# Patient Record
Sex: FEMALE | Race: WHITE | Employment: FULL TIME | URBAN - METROPOLITAN AREA
[De-identification: names, ages, dates, MRNs, and addresses within clinical notes are randomized per-mention and may not be internally consistent; named-entity substitution may affect disease eponyms.]

---

## 2019-09-13 PROBLEM — S46.819A TRAPEZIUS STRAIN: Status: ACTIVE | Noted: 2018-07-25

## 2019-09-13 PROBLEM — F41.1 GENERALIZED ANXIETY DISORDER: Status: ACTIVE | Noted: 2018-07-25

## 2019-09-13 PROBLEM — F41.0 PANIC ATTACKS: Status: ACTIVE | Noted: 2018-07-25

## 2019-09-13 PROBLEM — M67.432 GANGLION CYST OF WRIST, LEFT: Status: ACTIVE | Noted: 2018-10-16

## 2021-04-05 ENCOUNTER — HOSPITAL ENCOUNTER (EMERGENCY)
Age: 36
Discharge: HOME | End: 2021-04-05
Attending: EMERGENCY MEDICINE
Payer: MEDICARE

## 2021-04-05 ENCOUNTER — APPOINTMENT (OUTPATIENT)
Dept: GENERAL RADIOLOGY | Age: 36
End: 2021-04-05
Payer: MEDICARE

## 2021-04-05 VITALS
WEIGHT: 155 LBS | BODY MASS INDEX: 27.46 KG/M2 | TEMPERATURE: 98.4 F | SYSTOLIC BLOOD PRESSURE: 131 MMHG | OXYGEN SATURATION: 97 % | RESPIRATION RATE: 18 BRPM | HEART RATE: 82 BPM | HEIGHT: 63 IN | DIASTOLIC BLOOD PRESSURE: 70 MMHG

## 2021-04-05 DIAGNOSIS — M25.562 ACUTE PAIN OF LEFT KNEE: ICD-10-CM

## 2021-04-05 DIAGNOSIS — M76.899 QUADRICEPS TENDONITIS: Primary | ICD-10-CM

## 2021-04-05 PROCEDURE — 73562 X-RAY EXAM OF KNEE 3: CPT

## 2021-04-05 PROCEDURE — 99283 EMERGENCY DEPT VISIT LOW MDM: CPT

## 2021-04-05 RX ORDER — HYDROXYZINE HYDROCHLORIDE 10 MG/1
10 TABLET, FILM COATED ORAL 3 TIMES DAILY PRN
COMMUNITY

## 2021-04-05 ASSESSMENT — PAIN SCALES - GENERAL: PAINLEVEL_OUTOF10: 7

## 2021-04-05 ASSESSMENT — PAIN DESCRIPTION - ONSET: ONSET: ON-GOING

## 2021-04-05 ASSESSMENT — PAIN - FUNCTIONAL ASSESSMENT: PAIN_FUNCTIONAL_ASSESSMENT: PREVENTS OR INTERFERES SOME ACTIVE ACTIVITIES AND ADLS

## 2021-04-06 NOTE — ED PROVIDER NOTES
[id number] [facility] ED  [address] [facility] [address]  Phone: [phone]  Fax: [phone]      eMERGENCY dEPARTMENT eNCOUnter      Pt Name: Magdiel Casiano  MRN: [id number]  [place] 1985  Date of evaluation: 4/5/21      CHIEF COMPLAINT:  Chief Complaint   Patient presents with    Knee Pain       HISTORY OF PRESENT ILLNESS    Magdiel Casiano is a 30 y.o. female who presents with evaluation for orthopedic pain:    Location/Symptom:   RIGHT  /   LEFT   knee pain  Timing/Onset:    Context/Setting:    Quality:   Achy, sharp  Duration:   constant  Modifying Factors: Worse with movement and weightbearing, better with rest  Severity:   Moderate    Nursing Notes were reviewed. REVIEW OF SYSTEMS       Constitutional: Denies recent fever, chills. Eyes: No vision changes. Neck: No neck pain. Respiratory: Denies recent shortness of breath. Cardiac:  Denies recent chest pain. GI:  Denies abdominal pain/nausea/vomiting/diarrhea. : Denies dysuria. Musculoskeletal:   Per HPI  Neurologic:  No headache. No focal weakness. No paresthesias. Skin:  Denies any rash. Negative in 10 essential Systems except as mentioned above and in the HPI. PAST MEDICAL HISTORY   PMH:  has a past medical history of Genital HSV and Tobacco use complicating pregnancy. Surgical History:  has no past surgical history on file. Social History:  reports that she quit smoking about 7 years ago. She smoked 0.50 packs per day. She has never used smokeless tobacco. She reports that she does not drink alcohol or use drugs. Family History: None  Psychiatric History: None    Allergies:has No Known Allergies.       PHYSICAL EXAM     INITIAL VITALS: /70   Pulse 82   Temp 98.4 °F (36.9 °C) (Oral)   Resp 18   Ht 5' 3\" (1.6 m)   Wt 70.3 kg (155 lb)   LMP 03/02/2021   SpO2 97%   Breastfeeding No   BMI 27.46 kg/m²   Constitutional:  Well developed   Eyes:  Pupils equal/round  HENT:  Atraumatic,

## 2021-04-06 NOTE — ED PROVIDER NOTES
[id number] [facility] ED  [address] [address] [facility] [facility] [address]  Phone: [phone]  Fax: [phone]      Attending Physician Attestation    I performed a history and physical examination of the patient and discussed management with the mid level provider. I reviewed the mid level provider's note and agree with the documented findings and plan of care. Any areas of disagreement are noted on the chart. I was personally present for the key portions of any procedures. I have documented in the chart those procedures where I was not present during the key portions. I have reviewed the emergency nurses triage note. I agree with the chief complaint, past medical history, past surgical history, allergies, medications, social and family history as documented unless otherwise noted below. Documentation of the HPI, Physical Exam and Medical Decision Making performed by mid level providers is based on my personal performance of the HPI, PE and MDM. For Physician Assistant/ Nurse Practitioner cases/documentation I have personally evaluated this patient and have completed at least one if not all key elements of the E/M (history, physical exam, and MDM). Additional findings are as noted. CHIEF COMPLAINT       Chief Complaint   Patient presents with    Knee Pain         HISTORY OF PRESENT ILLNESS    Lebron Sousa is a 30 y.o. female who presents for evaluation of left knee pain. The patient reports that starting 8 days ago she developed gradual onset, constant, progressive, dull, achy, nonradiating pain to her left medial knee. She states that she works as a  and has been working more than normal.  She denies any trauma or specific injury to the left knee. Her pain is worse with palpation over the left medial knee. She has taken Tylenol with some improvement. She has also applied ice with some improvement. The patient denies any previous injury or surgery to the left knee.   She denies fever, chills, headache, vision changes, neck pain, back pain, chest pain, shortness of breath, abdominal pain, urinary/bowel symptoms, focal weakness, numbness, tingling, recent injury or illness. PAST MEDICAL HISTORY    has a past medical history of Genital HSV and Tobacco use complicating pregnancy. SURGICAL HISTORY      has no past surgical history on file. She denies    CURRENT MEDICATIONS       Discharge Medication List as of 4/5/2021  9:31 PM      CONTINUE these medications which have NOT CHANGED    Details   hydrOXYzine (ATARAX) 10 MG tablet Take 10 mg by mouth 3 times daily as needed for ItchingHistorical Med      ibuprofen (ADVIL;MOTRIN) 800 MG tablet Take 1 tablet by mouth every 8 hours as needed. , Disp-30 tablet, R-0      iron polysaccharides (NIFEREX) 150 MG capsule Take 1 capsule by mouth daily. , Disp-60 capsule, R-3      docusate sodium (COLACE, DULCOLAX) 100 MG CAPS Take 100 mg by mouth 2 times daily. , Disp-60 capsule, R-1      PRENATAL VITAMINS PO Take 1 tablet by mouth daily. ondansetron (ZOFRAN) 4 MG tablet Take 1 tablet by mouth every 8 hours as needed for Nausea., Disp-15 tablet, R-0             ALLERGIES     has No Known Allergies. FAMILY HISTORY     She indicated that her mother is alive. She indicated that her father is alive. She indicated that both of her sisters are alive. She indicated that her brother is alive. family history includes Other in her father; Thyroid Disease in her mother. SOCIAL HISTORY      reports that she quit smoking about 7 years ago. She smoked 0.50 packs per day. She has never used smokeless tobacco. She reports that she does not drink alcohol or use drugs. PHYSICAL EXAM     INITIAL VITALS:  height is 5' 3\" (1.6 m) and weight is 70.3 kg (155 lb). Her oral temperature is 98.4 °F (36.9 °C). Her blood pressure is 131/70 and her pulse is 82. Her respiration is 18 and oxygen saturation is 97%. Heart regular rate and rhythm.   Lungs clear to auscultation. Abdomen soft nontender. No midline spinal tenderness to palpation, step-off or deformity. Tenderness palpation over the left medial knee without any overlying erythema, edema, ecchymosis or signs of an injury. No ligamentous laxity with medial or lateral strain of the knee bilaterally. Negative anterior and posterior drawer test bilaterally. Negative Lachman's test bilaterally. No palpable Baker cyst.  No joint effusion at the knees. No pain with manipulation of the patella. No pain at the hips or ankles. Normal Pang test.  No pain over the proximal fibular head or the base of the fifth metatarsal. Compartments soft. Strength 5/5 with knee/hip extension and flexion bilaterally. Strength 5/5 with plantar/dorsiflexion of the bilateral feet. DP/PT/Popliteal pulses 2+/4 and equal bilaterally. Normal gait. DIAGNOSTIC RESULTS     EKG: All EKG's are interpreted by the Emergency Department Physician who either signs or Co-signs this chart in the absence of a cardiologist.    none    RADIOLOGY:   Non-plain film images such as CT, Ultrasound and MRI are read by the radiologist. Ion Miller radiographic images are visualized and the radiologist interpretations are reviewed as follows:     Xr Knee Left (3 Views)    Result Date: 4/5/2021  EXAMINATION: THREE XRAY VIEWS OF THE LEFT KNEE 4/5/2021 8:56 pm COMPARISON: None. HISTORY: ORDERING SYSTEM PROVIDED HISTORY: Pain, quad tendon, medial/superior knee TECHNOLOGIST PROVIDED HISTORY: Pain, quad tendon, medial/superior knee Reason for Exam: Pain, quad tendon, medial/superior knee. pt denies any known injury Acuity: Acute Type of Exam: Initial FINDINGS: No acute fracture. No dislocation. Joint spaces are maintained. No focal abnormality in the overlying soft tissues. No acute osseous abnormality. LABS:  No results found for this visit on 04/05/21.     none    EMERGENCY DEPARTMENT COURSE:   Vitals:    Vitals:    04/05/21 2032   BP: 131/70   Pulse: 82   Resp: 18   Temp: 98.4 °F (36.9 °C)   TempSrc: Oral   SpO2: 97%   Weight: 70.3 kg (155 lb)   Height: 5' 3\" (1.6 m)     -------------------------  BP: 131/70, Temp: 98.4 °F (36.9 °C), Pulse: 82, Resp: 18      PERTINENT ATTENDING PHYSICIAN COMMENTS:    The patient is a 80-year-old female who presents for evaluation of left knee pain. Vital signs are stable. Exam reveals tenderness over the left medial knee without any ligamentous laxity. She is neurovascularly intact. X-ray of the left knee shows no acute process. I suspect she has a strain of the left medial quadriceps muscle. I have low suspicion for fracture, dislocation, or infection. The patient was instructed to take ibuprofen or Tylenol as needed for pain and to follow-up with her PCP in 1 to 2 days. She was instructed to apply ice for 20 minutes at a time and to elevate her left leg is much as possible. She was instructed to return to the ED for worsening symptoms or any other concern. The patient understands that at this time there is no evidence for a more malignant underlying process, but also understands that early in the process of an illness or injury, an emergency department work-up can be falsely reassuring. Routine discharge counseling was given, and the patient understands that worsening, changing or persistent symptoms should prompt a immediate call or follow-up with their primary care physician or return to the emergency department. The importance of appropriate follow-up was also discussed. I have reviewed the disposition diagnosis with the patient. I have answered their questions and given discharge instructions. They voiced understanding of these instructions and did not have any further questions or complaints.         (Please note that portions of this note were completed with a voice recognition program.  Efforts were made to edit the dictations but occasionally words are mis-transcribed.)    Eleonora Key, DO [place]